# Patient Record
Sex: MALE | Race: WHITE | Employment: FULL TIME | URBAN - METROPOLITAN AREA
[De-identification: names, ages, dates, MRNs, and addresses within clinical notes are randomized per-mention and may not be internally consistent; named-entity substitution may affect disease eponyms.]

---

## 2017-04-10 ENCOUNTER — OFFICE VISIT (OUTPATIENT)
Dept: FAMILY MEDICINE CLINIC | Facility: CLINIC | Age: 42
End: 2017-04-10

## 2017-04-10 VITALS
TEMPERATURE: 99 F | WEIGHT: 190.19 LBS | SYSTOLIC BLOOD PRESSURE: 124 MMHG | OXYGEN SATURATION: 99 % | DIASTOLIC BLOOD PRESSURE: 78 MMHG | HEART RATE: 72 BPM

## 2017-04-10 DIAGNOSIS — J02.9 SORE THROAT: ICD-10-CM

## 2017-04-10 DIAGNOSIS — J01.10 ACUTE NON-RECURRENT FRONTAL SINUSITIS: Primary | ICD-10-CM

## 2017-04-10 PROCEDURE — 87081 CULTURE SCREEN ONLY: CPT | Performed by: NURSE PRACTITIONER

## 2017-04-10 PROCEDURE — 99214 OFFICE O/P EST MOD 30 MIN: CPT | Performed by: NURSE PRACTITIONER

## 2017-04-10 PROCEDURE — 87880 STREP A ASSAY W/OPTIC: CPT | Performed by: NURSE PRACTITIONER

## 2017-04-10 RX ORDER — EPINEPHRINE 0.3 MG/.3ML
INJECTION SUBCUTANEOUS
COMMUNITY
Start: 2014-09-19

## 2017-04-10 RX ORDER — AMOXICILLIN 875 MG/1
875 TABLET, COATED ORAL 2 TIMES DAILY
Qty: 20 TABLET | Refills: 0 | Status: SHIPPED | OUTPATIENT
Start: 2017-04-10 | End: 2017-04-20

## 2017-04-10 NOTE — PATIENT INSTRUCTIONS
Rest, increase fluids, salt water gargles ,neti pot (use distilled water) or saline nasal spray, Advil cold and sinus (behind the counter), Tylenol, follow up if symptoms persist or increase.

## 2017-04-10 NOTE — PROGRESS NOTES
CHIEF COMPLAINT:   Patient presents with:  Nasal Congestion  Cough  Sore Throat      HPI:   Lamberto Corey is a 38year old male who presents to clinic today with complaints of sore throat for a month- symptoms will come and go- was ok on Friday- no cyanosis, clubbing or edema  SKIN: no rashes,no suspicious lesions  ASSESSMENT AND PLAN:   Tess Timmons is a 38year old male who presents with ear problems symptoms are consistent with  ASSESSMENT:  Acute non-recurrent frontal sinusitis  (prim

## 2017-04-12 ENCOUNTER — TELEPHONE (OUTPATIENT)
Dept: FAMILY MEDICINE CLINIC | Facility: CLINIC | Age: 42
End: 2017-04-12

## 2017-04-12 NOTE — TELEPHONE ENCOUNTER
----- Message from JOSIAH Coley sent at 4/12/2017  8:13 AM CDT -----  Negative strep culture- please notify patient

## 2018-03-12 ENCOUNTER — HOSPITAL ENCOUNTER (OUTPATIENT)
Dept: GENERAL RADIOLOGY | Age: 43
Discharge: HOME | End: 2018-03-12
Attending: NURSE PRACTITIONER
Payer: COMMERCIAL

## 2018-03-12 ENCOUNTER — OFFICE VISIT (OUTPATIENT)
Dept: FAMILY MEDICINE CLINIC | Facility: CLINIC | Age: 43
End: 2018-03-12

## 2018-03-12 VITALS
HEART RATE: 60 BPM | RESPIRATION RATE: 16 BRPM | SYSTOLIC BLOOD PRESSURE: 112 MMHG | HEIGHT: 67 IN | TEMPERATURE: 97 F | BODY MASS INDEX: 31.26 KG/M2 | DIASTOLIC BLOOD PRESSURE: 86 MMHG | WEIGHT: 199.19 LBS

## 2018-03-12 DIAGNOSIS — M25.521 RIGHT ELBOW PAIN: Primary | ICD-10-CM

## 2018-03-12 DIAGNOSIS — M25.521 RIGHT ELBOW PAIN: ICD-10-CM

## 2018-03-12 PROCEDURE — 73080 X-RAY EXAM OF ELBOW: CPT | Performed by: NURSE PRACTITIONER

## 2018-03-12 PROCEDURE — 99213 OFFICE O/P EST LOW 20 MIN: CPT | Performed by: NURSE PRACTITIONER

## 2018-03-12 NOTE — PROGRESS NOTES
HPI:    Patient ID: Alfredo Gillette is a 41year old male. HPI     Pain to the right elbow for the past few months, gradually getting worse. 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 Taking Aleve and Biofreeze.    Pain non-radiating, unless bumped, then

## 2018-03-12 NOTE — PATIENT INSTRUCTIONS
Referral to ortho. Take copy of x-rays to the appointment. Keep taking aleve for the pain. Follow-up as needed.

## 2018-03-13 PROBLEM — M25.521 RIGHT ELBOW PAIN: Status: ACTIVE | Noted: 2018-03-13

## 2022-04-28 ENCOUNTER — OFFICE VISIT (OUTPATIENT)
Dept: FAMILY MEDICINE CLINIC | Facility: CLINIC | Age: 47
End: 2022-04-28
Payer: COMMERCIAL

## 2022-04-28 VITALS
RESPIRATION RATE: 18 BRPM | HEART RATE: 59 BPM | TEMPERATURE: 97 F | HEIGHT: 68.5 IN | SYSTOLIC BLOOD PRESSURE: 110 MMHG | DIASTOLIC BLOOD PRESSURE: 71 MMHG | WEIGHT: 206 LBS | OXYGEN SATURATION: 99 % | BODY MASS INDEX: 30.86 KG/M2

## 2022-04-28 DIAGNOSIS — Z00.00 ENCOUNTER FOR HEALTH MAINTENANCE EXAMINATION IN ADULT: Primary | ICD-10-CM

## 2022-04-28 DIAGNOSIS — R03.0 ELEVATED BLOOD PRESSURE READING: ICD-10-CM

## 2022-04-28 DIAGNOSIS — Z12.11 ENCOUNTER FOR SCREENING COLONOSCOPY: ICD-10-CM

## 2022-04-28 PROCEDURE — 3078F DIAST BP <80 MM HG: CPT | Performed by: NURSE PRACTITIONER

## 2022-04-28 PROCEDURE — 3074F SYST BP LT 130 MM HG: CPT | Performed by: NURSE PRACTITIONER

## 2022-04-28 PROCEDURE — 99396 PREV VISIT EST AGE 40-64: CPT | Performed by: NURSE PRACTITIONER

## 2022-04-28 PROCEDURE — 3008F BODY MASS INDEX DOCD: CPT | Performed by: NURSE PRACTITIONER

## 2022-04-28 RX ORDER — EPINEPHRINE 0.3 MG/.3ML
0.3 INJECTION SUBCUTANEOUS AS NEEDED
Qty: 1 EACH | Refills: 1 | Status: SHIPPED | OUTPATIENT
Start: 2022-04-28

## 2022-04-28 RX ORDER — CHLORHEXIDINE GLUCONATE 0.12 MG/ML
RINSE ORAL
COMMUNITY
Start: 2022-04-13 | End: 2022-04-28

## 2022-04-28 NOTE — PATIENT INSTRUCTIONS
Colonoscopy - see referral.     Check on last Tetanus shot     Follow up at [facility] for fasting blood work     Monitor blood pressure - bring blood pressure cuff in to appointment for comparison. For your eye- check blood pressure -  And blood work - if normal- then would recommend [employer].

## 2022-04-29 ENCOUNTER — TELEPHONE (OUTPATIENT)
Dept: FAMILY MEDICINE CLINIC | Facility: CLINIC | Age: 47
End: 2022-04-29

## 2022-04-29 NOTE — TELEPHONE ENCOUNTER
----- Message from LENNOX Spaulding sent at 4/29/2022 11:36 AM CDT -----  Please call [facility] to add on A1c. Is make sure patient receives [employer] messages below-Your blood work shows a slightly elevated blood sugar-fasting blood sugar should be less than 100-I will add a hemoglobin A1c which is an average of your blood sugar over the last 3 months to check for prediabetes. Otherwise your metabolic panel looks good normal liver and kidney function, normal blood count, normal thyroid, normal PSA. Your cholesterol is high at 253, your good cholesterol (HDLs) are good at 60, however, your LDLs (bad cholesterol) are high at 170-triglycerides are okay at 106. Recommended diet high in vegetables and lean proteins low in carbohydrates, sugars, saturated fats. ,  Continue regular exercise-recommend fish oil supplement if you are not already taking, and repeat cholesterol next year. Thank Radha Escalera, LENNOX, FNP-BC

## 2022-04-30 LAB
ABSOLUTE BASOPHILS: 53 CELLS/UL (ref 0–200)
ABSOLUTE EOSINOPHILS: 38 CELLS/UL (ref 15–500)
ABSOLUTE LYMPHOCYTES: 1598 CELLS/UL (ref 850–3900)
ABSOLUTE MONOCYTES: 221 CELLS/UL (ref 200–950)
ABSOLUTE NEUTROPHILS: 2890 CELLS/UL (ref 1500–7800)
ALBUMIN/GLOBULIN RATIO: 1.8 (CALC) (ref 1–2.5)
ALBUMIN: 4.6 G/DL (ref 3.6–5.1)
ALKALINE PHOSPHATASE: 44 U/L (ref 36–130)
ALT: 40 U/L (ref 9–46)
AST: 19 U/L (ref 10–40)
BASOPHILS: 1.1 %
BILIRUBIN, TOTAL: 0.7 MG/DL (ref 0.2–1.2)
BUN: 23 MG/DL (ref 7–25)
CALCIUM: 9.5 MG/DL (ref 8.6–10.3)
CARBON DIOXIDE: 28 MMOL/L (ref 20–32)
CHLORIDE: 103 MMOL/L (ref 98–110)
CHOL/HDLC RATIO: 4.2 (CALC)
CHOLESTEROL, TOTAL: 253 MG/DL
CREATININE: 1.09 MG/DL (ref 0.6–1.35)
EGFR IF AFRICN AM: 95 ML/MIN/1.73M2
EGFR IF NONAFRICN AM: 82 ML/MIN/1.73M2
EOSINOPHILS: 0.8 %
GLOBULIN: 2.6 G/DL (CALC) (ref 1.9–3.7)
GLUCOSE: 107 MG/DL (ref 65–99)
HDL CHOLESTEROL: 60 MG/DL
HEMATOCRIT: 42.8 % (ref 38.5–50)
HEMOGLOBIN A1C: 5.3 % OF TOTAL HGB
HEMOGLOBIN: 14.9 G/DL (ref 13.2–17.1)
LDL-CHOLESTEROL: 170 MG/DL (CALC)
LYMPHOCYTES: 33.3 %
MCH: 30.7 PG (ref 27–33)
MCHC: 34.8 G/DL (ref 32–36)
MCV: 88.1 FL (ref 80–100)
MONOCYTES: 4.6 %
MPV: 11.8 FL (ref 7.5–12.5)
NEUTROPHILS: 60.2 %
NON-HDL CHOLESTEROL: 193 MG/DL (CALC)
PLATELET COUNT: 199 THOUSAND/UL (ref 140–400)
POTASSIUM: 4.7 MMOL/L (ref 3.5–5.3)
PROTEIN, TOTAL: 7.2 G/DL (ref 6.1–8.1)
PSA, TOTAL: 0.42 NG/ML
RDW: 13.2 % (ref 11–15)
RED BLOOD CELL COUNT: 4.86 MILLION/UL (ref 4.2–5.8)
SODIUM: 139 MMOL/L (ref 135–146)
T4, FREE: 0.9 NG/DL (ref 0.8–1.8)
TRIGLYCERIDES: 106 MG/DL
TSH: 2.5 MIU/L (ref 0.4–4.5)
WHITE BLOOD CELL COUNT: 4.8 THOUSAND/UL (ref 3.8–10.8)

## 2022-05-02 NOTE — TELEPHONE ENCOUNTER
----- Message from LENNOX Renteria sent at 5/2/2022  7:34 AM CDT -----  Please make sure patient receives MyChart message as below-  Your hemoglobin A1c is in the nondiabetic range at 5.3-recommend healthy diet as in previous note. Recommend annual fasting blood work.   Thank LENNOX Balderrama, FNP-BC

## 2022-06-03 ENCOUNTER — OFFICE VISIT (OUTPATIENT)
Dept: FAMILY MEDICINE CLINIC | Facility: CLINIC | Age: 47
End: 2022-06-03
Payer: COMMERCIAL

## 2022-06-03 VITALS
TEMPERATURE: 97 F | HEART RATE: 59 BPM | BODY MASS INDEX: 29.96 KG/M2 | WEIGHT: 200 LBS | SYSTOLIC BLOOD PRESSURE: 122 MMHG | RESPIRATION RATE: 18 BRPM | OXYGEN SATURATION: 99 % | HEIGHT: 68.5 IN | DIASTOLIC BLOOD PRESSURE: 80 MMHG

## 2022-06-03 DIAGNOSIS — R03.0 ELEVATED BLOOD PRESSURE READING: ICD-10-CM

## 2022-06-03 DIAGNOSIS — M25.561 ACUTE PAIN OF RIGHT KNEE: Primary | ICD-10-CM

## 2022-06-03 PROCEDURE — 99214 OFFICE O/P EST MOD 30 MIN: CPT | Performed by: NURSE PRACTITIONER

## 2022-06-03 PROCEDURE — 3074F SYST BP LT 130 MM HG: CPT | Performed by: NURSE PRACTITIONER

## 2022-06-03 PROCEDURE — 3008F BODY MASS INDEX DOCD: CPT | Performed by: NURSE PRACTITIONER

## 2022-06-03 PROCEDURE — 3079F DIAST BP 80-89 MM HG: CPT | Performed by: NURSE PRACTITIONER

## 2022-06-06 ENCOUNTER — TELEPHONE (OUTPATIENT)
Dept: FAMILY MEDICINE CLINIC | Facility: CLINIC | Age: 47
End: 2022-06-06

## 2022-06-08 ENCOUNTER — TELEPHONE (OUTPATIENT)
Dept: FAMILY MEDICINE CLINIC | Facility: CLINIC | Age: 47
End: 2022-06-08

## 2022-06-08 ENCOUNTER — HOSPITAL ENCOUNTER (OUTPATIENT)
Dept: GENERAL RADIOLOGY | Age: 47
Discharge: HOME | End: 2022-06-08
Attending: NURSE PRACTITIONER
Payer: COMMERCIAL

## 2022-06-08 ENCOUNTER — OFFICE VISIT (OUTPATIENT)
Dept: FAMILY MEDICINE CLINIC | Facility: CLINIC | Age: 47
End: 2022-06-08
Payer: COMMERCIAL

## 2022-06-08 VITALS
WEIGHT: 198.38 LBS | SYSTOLIC BLOOD PRESSURE: 128 MMHG | DIASTOLIC BLOOD PRESSURE: 88 MMHG | OXYGEN SATURATION: 99 % | RESPIRATION RATE: 16 BRPM | TEMPERATURE: 97 F | BODY MASS INDEX: 29.72 KG/M2 | HEART RATE: 58 BPM | HEIGHT: 68.5 IN

## 2022-06-08 DIAGNOSIS — M25.461 KNEE EFFUSION, RIGHT: Primary | ICD-10-CM

## 2022-06-08 DIAGNOSIS — M25.461 KNEE EFFUSION, RIGHT: ICD-10-CM

## 2022-06-08 PROCEDURE — 3008F BODY MASS INDEX DOCD: CPT | Performed by: NURSE PRACTITIONER

## 2022-06-08 PROCEDURE — 99213 OFFICE O/P EST LOW 20 MIN: CPT | Performed by: NURSE PRACTITIONER

## 2022-06-08 PROCEDURE — 3074F SYST BP LT 130 MM HG: CPT | Performed by: NURSE PRACTITIONER

## 2022-06-08 PROCEDURE — 3079F DIAST BP 80-89 MM HG: CPT | Performed by: NURSE PRACTITIONER

## 2022-06-08 PROCEDURE — 73562 X-RAY EXAM OF KNEE 3: CPT | Performed by: NURSE PRACTITIONER

## 2022-06-08 RX ORDER — LISINOPRIL 10 MG/1
10 TABLET ORAL DAILY
Qty: 90 TABLET | Refills: 0 | Status: SHIPPED | OUTPATIENT
Start: 2022-06-08 | End: 2023-06-03

## 2022-06-08 NOTE — PATIENT INSTRUCTIONS
Start lisinopril 10 mg daily     Monitor b/p -  Send blood pressure numbers through my chart- check blood pressure once a day.      X-ray of knee today     Continue ice, brace, aleve, stay off of it -     Follow up with orthopaedics

## 2022-06-08 NOTE — TELEPHONE ENCOUNTER
----- Message from LENNOX Zehng sent at 6/8/2022 12:35 PM CDT -----  Please make sure patient receives [employer] messages below-Your knee x-ray shows arthritis to your knee follow-up with orthopedics on Monday as planned. Thank LENNOX Oakley, FNP-BC

## 2022-07-06 ENCOUNTER — TELEPHONE (OUTPATIENT)
Dept: FAMILY MEDICINE CLINIC | Facility: CLINIC | Age: 47
End: 2022-07-06

## 2022-07-06 NOTE — TELEPHONE ENCOUNTER
Pt was stung by a bee on 7/4/22 on his middle finger of his left hand. He went to [facility] on 7/5/22 but had a bad experience there. They prescribed a cream for the patient but it was too expensive to buy. Pt has been taking Benadryl 50 mg tablets. Pt's finger is a little more swollen today. He has applied calamine lotion to the area. Pt also using cold compresses. Pt has an epi-pen for bee stings and has needed to use it in the past but has not needed it. The last time he used it he had multiple bee stings. Pt requesting a steroid dose pack to be sent to the [facility] on [address]. Advised pt's spouse/Leah to contact the [facility] to see if they are willing to prescribe something else as they have seen the patient. Anita Cardona states she prefers something from Dr. Candi Devine. Please Advise.

## 2022-07-06 NOTE — TELEPHONE ENCOUNTER
Per Dr. Schwarz Doing, pt will need to be seen for an appointment. Left detailed message that patient will need to be seen in office for further treatment.

## 2022-07-07 ENCOUNTER — OFFICE VISIT (OUTPATIENT)
Dept: FAMILY MEDICINE CLINIC | Facility: CLINIC | Age: 47
End: 2022-07-07
Payer: COMMERCIAL

## 2022-07-07 VITALS
RESPIRATION RATE: 16 BRPM | WEIGHT: 197.63 LBS | TEMPERATURE: 98 F | OXYGEN SATURATION: 98 % | SYSTOLIC BLOOD PRESSURE: 126 MMHG | HEIGHT: 68.5 IN | HEART RATE: 62 BPM | BODY MASS INDEX: 29.61 KG/M2 | DIASTOLIC BLOOD PRESSURE: 84 MMHG

## 2022-07-07 DIAGNOSIS — T63.441A BEE STING REACTION, ACCIDENTAL OR UNINTENTIONAL, INITIAL ENCOUNTER: Primary | ICD-10-CM

## 2022-07-07 PROCEDURE — 3008F BODY MASS INDEX DOCD: CPT

## 2022-07-07 PROCEDURE — 3074F SYST BP LT 130 MM HG: CPT

## 2022-07-07 PROCEDURE — 3079F DIAST BP 80-89 MM HG: CPT

## 2022-07-07 PROCEDURE — 99213 OFFICE O/P EST LOW 20 MIN: CPT

## 2022-07-07 RX ORDER — HALOBETASOL PROPIONATE 0.05 %
OINTMENT (GRAM) TOPICAL
COMMUNITY
Start: 2022-07-05

## 2022-07-07 RX ORDER — CEPHALEXIN 500 MG/1
CAPSULE ORAL
COMMUNITY
Start: 2022-07-05

## 2022-07-07 RX ORDER — LEVOCETIRIZINE DIHYDROCHLORIDE 5 MG/1
5 TABLET, FILM COATED ORAL DAILY
COMMUNITY

## 2022-07-07 RX ORDER — PREDNISONE 20 MG/1
20 TABLET ORAL DAILY
Qty: 10 TABLET | Refills: 0 | Status: SHIPPED | OUTPATIENT
Start: 2022-07-07 | End: 2022-07-17

## 2022-09-06 RX ORDER — LISINOPRIL 10 MG/1
10 TABLET ORAL DAILY
Qty: 90 TABLET | Refills: 1 | Status: SHIPPED | OUTPATIENT
Start: 2022-09-06 | End: 2023-09-01

## 2022-09-06 NOTE — TELEPHONE ENCOUNTER
Future appt:    Last Appointment with provider:   6/8/2022  Last appointment at [facility]:  7/7/2022  CHOLESTEROL, TOTAL (mg/dL)   Date Value   04/28/2022 253 (H)     HDL CHOLESTEROL (mg/dL)   Date Value   04/28/2022 60     LDL-CHOLESTEROL (mg/dL (calc))   Date Value   04/28/2022 170 (H)     TRIGLYCERIDES (mg/dL)   Date Value   04/28/2022 106     Lab Results   Component Value Date    A1C 5.3 04/28/2022     Lab Results   Component Value Date    T4F 0.9 04/28/2022    TSH 2.50 04/28/2022     Last RF:  6/8/2022    No follow-ups on file.

## 2023-02-16 ENCOUNTER — OFFICE VISIT (OUTPATIENT)
Dept: FAMILY MEDICINE CLINIC | Facility: CLINIC | Age: 48
End: 2023-02-16
Payer: COMMERCIAL

## 2023-02-16 VITALS
BODY MASS INDEX: 30.86 KG/M2 | HEIGHT: 68.5 IN | TEMPERATURE: 98 F | OXYGEN SATURATION: 99 % | HEART RATE: 66 BPM | RESPIRATION RATE: 16 BRPM | SYSTOLIC BLOOD PRESSURE: 138 MMHG | WEIGHT: 206 LBS | DIASTOLIC BLOOD PRESSURE: 88 MMHG

## 2023-02-16 DIAGNOSIS — H65.93 FLUID LEVEL BEHIND TYMPANIC MEMBRANE OF BOTH EARS: ICD-10-CM

## 2023-02-16 DIAGNOSIS — I10 HYPERTENSION, UNSPECIFIED TYPE: Primary | ICD-10-CM

## 2023-02-16 PROCEDURE — 99214 OFFICE O/P EST MOD 30 MIN: CPT | Performed by: NURSE PRACTITIONER

## 2023-02-16 PROCEDURE — 3008F BODY MASS INDEX DOCD: CPT | Performed by: NURSE PRACTITIONER

## 2023-02-16 PROCEDURE — 3075F SYST BP GE 130 - 139MM HG: CPT | Performed by: NURSE PRACTITIONER

## 2023-02-16 PROCEDURE — 3079F DIAST BP 80-89 MM HG: CPT | Performed by: NURSE PRACTITIONER

## 2023-02-16 RX ORDER — LISINOPRIL 20 MG/1
20 TABLET ORAL DAILY
Qty: 90 TABLET | Refills: 0 | Status: SHIPPED | OUTPATIENT
Start: 2023-02-16

## 2023-02-16 RX ORDER — FLUTICASONE PROPIONATE 50 MCG
1 SPRAY, SUSPENSION (ML) NASAL DAILY
COMMUNITY

## 2023-02-16 RX ORDER — PREDNISONE 20 MG/1
20 TABLET ORAL DAILY
Qty: 5 TABLET | Refills: 0 | Status: SHIPPED | OUTPATIENT
Start: 2023-02-16 | End: 2023-02-21

## 2023-02-16 NOTE — PATIENT INSTRUCTIONS
Prednisone 20mg once a day for five days; take with food, side effects reviewed  Try switching antihistamine, can try Zyrtec  For next week use flonase two sprays each nostril once a day     Increase lisinopril to 20mg once a day  Recheck in 4 weeks  Monitor blood pressures at home, if consistently above 130 (top number) or 85( bottom number)  DASH diet, mediterranean dietary changes

## 2023-05-30 DIAGNOSIS — I10 HYPERTENSION, UNSPECIFIED TYPE: ICD-10-CM

## 2023-05-30 NOTE — TELEPHONE ENCOUNTER
Future appt:     Last Appointment with provider:   2/16/2023; Return in about 4 weeks (around 3/16/2023) for blood pressure recheck. Last appointment at [facility]:  2/16/2023  CHOLESTEROL, TOTAL (mg/dL)   Date Value   04/28/2022 253 (H)     HDL CHOLESTEROL (mg/dL)   Date Value   04/28/2022 60     LDL-CHOLESTEROL (mg/dL (calc))   Date Value   04/28/2022 170 (H)     TRIGLYCERIDES (mg/dL)   Date Value   04/28/2022 106     Lab Results   Component Value Date    A1C 5.3 04/28/2022     Lab Results   Component Value Date    T4F 0.9 04/28/2022    TSH 2.50 04/28/2022     Last RF:  2/16/2023    No follow-ups on file.

## 2023-05-31 ENCOUNTER — OFFICE VISIT (OUTPATIENT)
Dept: FAMILY MEDICINE CLINIC | Facility: CLINIC | Age: 48
End: 2023-05-31
Payer: COMMERCIAL

## 2023-05-31 VITALS
TEMPERATURE: 98 F | HEIGHT: 68 IN | SYSTOLIC BLOOD PRESSURE: 132 MMHG | RESPIRATION RATE: 18 BRPM | BODY MASS INDEX: 30.25 KG/M2 | WEIGHT: 199.63 LBS | DIASTOLIC BLOOD PRESSURE: 86 MMHG | HEART RATE: 60 BPM | OXYGEN SATURATION: 98 %

## 2023-05-31 DIAGNOSIS — I10 HYPERTENSION, UNSPECIFIED TYPE: ICD-10-CM

## 2023-05-31 DIAGNOSIS — Z09 FOLLOW-UP EXAM: Primary | ICD-10-CM

## 2023-05-31 PROCEDURE — 3079F DIAST BP 80-89 MM HG: CPT

## 2023-05-31 PROCEDURE — 3008F BODY MASS INDEX DOCD: CPT

## 2023-05-31 PROCEDURE — 99213 OFFICE O/P EST LOW 20 MIN: CPT

## 2023-05-31 PROCEDURE — 3075F SYST BP GE 130 - 139MM HG: CPT

## 2023-05-31 RX ORDER — LISINOPRIL 20 MG/1
20 TABLET ORAL DAILY
Qty: 90 TABLET | Refills: 0 | Status: SHIPPED | OUTPATIENT
Start: 2023-05-31

## 2023-05-31 RX ORDER — LISINOPRIL 20 MG/1
TABLET ORAL
Qty: 90 TABLET | Refills: 0 | OUTPATIENT
Start: 2023-05-31

## 2023-05-31 NOTE — TELEPHONE ENCOUNTER
Future Appointments   Date Time Provider Doyle Lopes   5/31/2023  1:00 PM LENNOX Kunz [facility]

## 2023-05-31 NOTE — TELEPHONE ENCOUNTER
----- Message from Lisa Jones sent at 5/31/2023  8:10 AM CDT -----  Patient prefers phone calls instead of MyChart message -- states he doesn't normally check his messages. Call back [phone] he has been checking his BP at home and is doing well. 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 He states pharmacy gave him a few days of meds to get by.

## 2023-09-17 DIAGNOSIS — I10 HYPERTENSION, UNSPECIFIED TYPE: ICD-10-CM

## 2023-09-17 DIAGNOSIS — Z09 FOLLOW-UP EXAM: ICD-10-CM

## 2023-09-19 RX ORDER — LISINOPRIL 20 MG/1
20 TABLET ORAL DAILY
Qty: 90 TABLET | Refills: 0 | Status: SHIPPED | OUTPATIENT
Start: 2023-09-19

## 2023-09-19 NOTE — TELEPHONE ENCOUNTER
Future appt:    Last Appointment with provider:   5/31/2023 Follow up exam  Last appointment at [facility]:  5/31/2023  CHOLESTEROL, TOTAL (mg/dL)   Date Value   04/28/2022 253 (H)     HDL CHOLESTEROL (mg/dL)   Date Value   04/28/2022 60     LDL-CHOLESTEROL (mg/dL (calc))   Date Value   04/28/2022 170 (H)     TRIGLYCERIDES (mg/dL)   Date Value   04/28/2022 106     Lab Results   Component Value Date    A1C 5.3 04/28/2022     Lab Results   Component Value Date    T4F 0.9 04/28/2022    TSH 2.50 04/28/2022       No follow-ups on file.

## (undated) NOTE — MR AVS SNAPSHOT
[address]  [id number]  [phone]               Thank you for choosing us for your health care visit with JOSIAH Gerardo.   We are glad to serve you and happy to provide you with this summary o visit,  view other health information, and more. To sign up or find more information, go to [email or web address] and click on the Sign Up Now link in the [employer] box.      Enter your [employer] Activation Code exactly as it appears below along with yo Don’t forget strength training with weights and resistance Set goals and track your progress   You don’t need to join a gym. Home exercises work great.  Put more priority on exercise in your life                    Visit [facility] online at